# Patient Record
Sex: FEMALE | Race: WHITE | NOT HISPANIC OR LATINO | Employment: FULL TIME | URBAN - METROPOLITAN AREA
[De-identification: names, ages, dates, MRNs, and addresses within clinical notes are randomized per-mention and may not be internally consistent; named-entity substitution may affect disease eponyms.]

---

## 2019-06-10 LAB
ABO + RH BLD: NORMAL
AMPHETAMINES UR QL SCN: NEGATIVE
ARMBAND: NORMAL
BARBITURATES UR QL SCN: NEGATIVE
BASOPHILS # BLD AUTO: 0.1 10*3/UL (ref 0–0.2)
BASOPHILS NFR BLD AUTO: 1 % (ref 0–2)
BENZODIAZ UR QL SCN: NEGATIVE
BILIRUB UR QL STRIP: NEGATIVE MG/DL
BLD COMPONENT TYPE: NORMAL
BLD GP AB SCN SERPL QL: NEGATIVE
BLOOD BANK CMNT PATIENT-IMP: NORMAL
BZE UR QL SCN: NEGATIVE
CASTS URNS QL MICRO: NORMAL /[LPF]
COLOR UR: YELLOW
CONV BACTERIA IN URINE MICRO: NEGATIVE
CONV CLARITY OF URINE: CLEAR
CONV HIV-1/ HIV-2: NEGATIVE
CONV HYALINE CASTS IN URINE MICRO: 1 /[LPF] (ref 0–5)
CONV PROTEIN IN URINE BY AUTOMATED TEST STRIP: NEGATIVE MG/DL
CONV SMALL ROUND CELLS: NORMAL /[HPF]
CONV UROBILINOGEN IN URINE BY AUTOMATED TEST STRIP: 0.2 MG/DL
CREAT 24H UR-MCNC: 18.3 MG/DL
CROSSMATCH EXPIRATION: NORMAL
CULTURE INDICATED?: NORMAL
DIFFERENTIAL METHOD BLD: (no result)
EOSINOPHIL # BLD AUTO: 0.1 10*3/UL (ref 0–0.3)
EOSINOPHIL # BLD AUTO: 1 % (ref 0–3)
ERYTHROCYTE [DISTWIDTH] IN BLOOD BY AUTOMATED COUNT: 14.3 % (ref 11.5–14.5)
GLUCOSE UR QL: NEGATIVE MG/DL
HCT VFR BLD AUTO: 41.8 % (ref 35–49)
HGB BLD-MCNC: 13.9 G/DL (ref 12–15)
HGB UR QL STRIP: NEGATIVE
KETONES UR QL STRIP: NEGATIVE MG/DL
LEUKOCYTE ESTERASE UR QL STRIP: NEGATIVE
LYMPHOCYTES # BLD AUTO: 1.8 10*3/UL (ref 0.8–4.8)
LYMPHOCYTES NFR BLD AUTO: 20 % (ref 18–42)
MCH RBC QN AUTO: 32.1 PG (ref 26–32)
MCHC RBC AUTO-ENTMCNC: 33.2 G/DL (ref 32–36)
MCV RBC AUTO: 96.9 FL (ref 80–94)
METHADONE UR QL SCN: NEGATIVE
MONOCYTES # BLD AUTO: 0.6 10*3/UL (ref 0.1–1.3)
MONOCYTES NFR BLD AUTO: 7 % (ref 2–11)
NEUTROPHILS # BLD AUTO: 6.3 10*3/UL (ref 2.3–8.6)
NEUTROPHILS NFR BLD AUTO: 71 % (ref 50–75)
NITRITE UR QL STRIP: NEGATIVE
NRBC BLD AUTO-RTO: 0 /100{WBCS}
NRBC/RBC NFR BLD MANUAL: 0 10*3/UL
OPIATES TESTED UR SCN: NEGATIVE
PCP UR QL: NEGATIVE
PH UR STRIP.AUTO: 7.5 [PH] (ref 4.5–8)
PLATELET # BLD AUTO: 171 10*3/UL (ref 150–450)
PMV BLD AUTO: 9.7 FL (ref 7.4–10.4)
RBC # BLD AUTO: 4.32 10*6/UL (ref 4–5.4)
RBC #/AREA URNS HPF: 0 /[HPF] (ref 0–3)
SP GR UR: 1.01 (ref 1–1.03)
SPERM URNS QL MICRO: NORMAL /[HPF]
SQUAMOUS SPT QL MICRO: 4 /[HPF] (ref 0–5)
T PALLIDUM IGG SER QL: NONREACTIVE
THC SERPLBLD CFM-MCNC: NEGATIVE NG/ML
UNIDENT CRYS URNS QL MICRO: NORMAL /[HPF]
WBC # BLD AUTO: 8.8 10*3/UL (ref 4.5–11.5)
WBC #/AREA URNS HPF: 0 /[HPF] (ref 0–5)
YEAST SPEC QL WET PREP: NORMAL /[HPF]

## 2019-06-12 ENCOUNTER — HOSPITAL ENCOUNTER (INPATIENT)
Dept: OBSTETRICS AND GYNECOLOGY | Facility: HOSPITAL | Age: 39
LOS: 3 days | Discharge: HOME | End: 2019-06-15
Attending: OBSTETRICS & GYNECOLOGY | Admitting: OBSTETRICS & GYNECOLOGY

## 2019-06-13 LAB
BASOPHILS # BLD AUTO: 0.1 10*3/UL (ref 0–0.2)
BASOPHILS NFR BLD AUTO: 0 % (ref 0–2)
DIFFERENTIAL METHOD BLD: (no result)
EOSINOPHIL # BLD AUTO: 0.2 10*3/UL (ref 0–0.3)
EOSINOPHIL # BLD AUTO: 1 % (ref 0–3)
ERYTHROCYTE [DISTWIDTH] IN BLOOD BY AUTOMATED COUNT: 13.9 % (ref 11.5–14.5)
HCT VFR BLD AUTO: 38.7 % (ref 35–49)
HGB BLD-MCNC: 12.7 G/DL (ref 12–15)
LYMPHOCYTES # BLD AUTO: 2.7 10*3/UL (ref 0.8–4.8)
LYMPHOCYTES NFR BLD AUTO: 19 % (ref 18–42)
MCH RBC QN AUTO: 32.3 PG (ref 26–32)
MCHC RBC AUTO-ENTMCNC: 32.9 G/DL (ref 32–36)
MCV RBC AUTO: 98.2 FL (ref 80–94)
MONOCYTES # BLD AUTO: 0.9 10*3/UL (ref 0.1–1.3)
MONOCYTES NFR BLD AUTO: 6 % (ref 2–11)
NEUTROPHILS # BLD AUTO: 10.3 10*3/UL (ref 2.3–8.6)
NEUTROPHILS NFR BLD AUTO: 74 % (ref 50–75)
NRBC BLD AUTO-RTO: 0 /100{WBCS}
NRBC/RBC NFR BLD MANUAL: 0 10*3/UL
PLATELET # BLD AUTO: 145 10*3/UL (ref 150–450)
PMV BLD AUTO: 9.7 FL (ref 7.4–10.4)
RBC # BLD AUTO: 3.94 10*6/UL (ref 4–5.4)
WBC # BLD AUTO: 14.1 10*3/UL (ref 4.5–11.5)

## 2019-06-14 PROBLEM — Z98.891 STATUS POST REPEAT LOW TRANSVERSE CESAREAN SECTION: Status: ACTIVE | Noted: 2019-06-14

## 2019-06-14 LAB
BASOPHILS # BLD AUTO: 0.1 10*3/UL (ref 0–0.2)
BASOPHILS NFR BLD AUTO: 1 % (ref 0–2)
DIFFERENTIAL METHOD BLD: (no result)
EOSINOPHIL # BLD AUTO: 0.2 10*3/UL (ref 0–0.3)
EOSINOPHIL # BLD AUTO: 2 % (ref 0–3)
ERYTHROCYTE [DISTWIDTH] IN BLOOD BY AUTOMATED COUNT: 14 % (ref 11.5–14.5)
HCT VFR BLD AUTO: 35.7 % (ref 35–49)
HGB BLD-MCNC: 11.9 G/DL (ref 12–15)
LYMPHOCYTES # BLD AUTO: 1.9 10*3/UL (ref 0.8–4.8)
LYMPHOCYTES NFR BLD AUTO: 16 % (ref 18–42)
MCH RBC QN AUTO: 32.4 PG (ref 26–32)
MCHC RBC AUTO-ENTMCNC: 33.4 G/DL (ref 32–36)
MCV RBC AUTO: 97.2 FL (ref 80–94)
MONOCYTES # BLD AUTO: 0.8 10*3/UL (ref 0.1–1.3)
MONOCYTES NFR BLD AUTO: 7 % (ref 2–11)
NEUTROPHILS # BLD AUTO: 8.7 10*3/UL (ref 2.3–8.6)
NEUTROPHILS NFR BLD AUTO: 74 % (ref 50–75)
NRBC BLD AUTO-RTO: 0 /100{WBCS}
NRBC/RBC NFR BLD MANUAL: 0 10*3/UL
PLATELET # BLD AUTO: 157 10*3/UL (ref 150–450)
PMV BLD AUTO: 9.6 FL (ref 7.4–10.4)
RBC # BLD AUTO: 3.68 10*6/UL (ref 4–5.4)
WBC # BLD AUTO: 11.7 10*3/UL (ref 4.5–11.5)

## 2019-06-14 RX ORDER — SIMETHICONE 80 MG
80 TABLET,CHEWABLE ORAL 3 TIMES DAILY PRN
Status: DISCONTINUED | OUTPATIENT
Start: 2019-06-15 | End: 2019-06-15 | Stop reason: HOSPADM

## 2019-06-14 RX ORDER — DOCUSATE SODIUM 100 MG/1
100 CAPSULE, LIQUID FILLED ORAL 2 TIMES DAILY
Status: DISCONTINUED | OUTPATIENT
Start: 2019-06-15 | End: 2019-06-15 | Stop reason: HOSPADM

## 2019-06-14 RX ORDER — IBUPROFEN 600 MG/1
600 TABLET ORAL EVERY 6 HOURS PRN
Status: DISCONTINUED | OUTPATIENT
Start: 2019-06-15 | End: 2019-06-15 | Stop reason: HOSPADM

## 2019-06-14 RX ORDER — BISACODYL 10 MG
10 SUPPOSITORY, RECTAL RECTAL NIGHTLY PRN
Status: DISCONTINUED | OUTPATIENT
Start: 2019-06-15 | End: 2019-06-15 | Stop reason: HOSPADM

## 2019-06-14 RX ORDER — OXYCODONE HYDROCHLORIDE 5 MG/1
5 TABLET ORAL EVERY 4 HOURS PRN
Status: DISCONTINUED | OUTPATIENT
Start: 2019-06-15 | End: 2019-06-15 | Stop reason: HOSPADM

## 2019-06-14 RX ORDER — LANOLIN 100 %
OINTMENT (GRAM) TOPICAL AS NEEDED
Status: DISCONTINUED | OUTPATIENT
Start: 2019-06-15 | End: 2019-06-15 | Stop reason: HOSPADM

## 2019-06-14 RX ORDER — FERROUS SULFATE TAB EC 324 MG (65 MG FE EQUIVALENT) 324 (65 FE) MG
324 TABLET DELAYED RESPONSE ORAL 2 TIMES DAILY WITH MEALS
Status: DISCONTINUED | OUTPATIENT
Start: 2019-06-15 | End: 2019-06-15 | Stop reason: HOSPADM

## 2019-06-14 RX ORDER — MULTIVITAMIN WITH IRON
1 TABLET ORAL DAILY
COMMUNITY
End: 2019-06-15 | Stop reason: HOSPADM

## 2019-06-14 RX ORDER — PRENATAL VIT/IRON FUM/FOLIC AC 27MG-0.8MG
1 TABLET ORAL DAILY
Status: DISCONTINUED | OUTPATIENT
Start: 2019-06-15 | End: 2019-06-15 | Stop reason: HOSPADM

## 2019-06-15 VITALS
OXYGEN SATURATION: 100 % | TEMPERATURE: 97.7 F | WEIGHT: 136.69 LBS | HEART RATE: 85 BPM | DIASTOLIC BLOOD PRESSURE: 69 MMHG | RESPIRATION RATE: 16 BRPM | SYSTOLIC BLOOD PRESSURE: 103 MMHG | BODY MASS INDEX: 24.22 KG/M2 | HEIGHT: 63 IN

## 2019-06-15 PROBLEM — Z98.891 STATUS POST REPEAT LOW TRANSVERSE CESAREAN SECTION: Status: RESOLVED | Noted: 2019-06-14 | Resolved: 2019-06-15

## 2019-06-15 RX ORDER — IBUPROFEN 600 MG/1
600 TABLET ORAL EVERY 6 HOURS PRN
Qty: 30 TABLET | Refills: 1 | Status: SHIPPED | OUTPATIENT
Start: 2019-06-15 | End: 2021-05-12

## 2019-06-15 RX ORDER — ACETAMINOPHEN AND CODEINE PHOSPHATE 120; 12 MG/5ML; MG/5ML
1 SOLUTION ORAL DAILY
Qty: 28 TABLET | Refills: 12 | Status: SHIPPED | OUTPATIENT
Start: 2019-07-15 | End: 2020-07-14

## 2019-06-15 RX ORDER — OXYCODONE HYDROCHLORIDE 5 MG/1
5 TABLET ORAL EVERY 4 HOURS PRN
Qty: 30 TABLET | Refills: 0 | Status: SHIPPED | OUTPATIENT
Start: 2019-06-15 | End: 2019-06-25

## 2019-06-15 RX ADMIN — IBUPROFEN 600 MG: 600 TABLET ORAL at 03:35

## 2019-06-15 RX ADMIN — IBUPROFEN 600 MG: 600 TABLET ORAL at 09:37

## 2019-06-15 RX ADMIN — OXYCODONE HYDROCHLORIDE 5 MG: 5 TABLET ORAL at 03:35

## 2019-06-15 RX ADMIN — OXYCODONE HYDROCHLORIDE 5 MG: 5 TABLET ORAL at 09:38

## 2019-06-15 RX ADMIN — PRENATAL VIT W/ FE FUMARATE-FA TAB 27-0.8 MG 1 TABLET: 27-0.8 TAB at 09:37

## 2019-06-15 RX ADMIN — DOCUSATE SODIUM 100 MG: 100 CAPSULE, LIQUID FILLED ORAL at 09:38

## 2019-06-15 RX ADMIN — FERROUS SULFATE TAB EC 324 MG (65 MG FE EQUIVALENT) 324 MG: 324 (65 FE) TABLET DELAYED RESPONSE at 13:04

## 2019-07-12 ENCOUNTER — PREP FOR SURGERY (OUTPATIENT)
Dept: OTHER | Facility: HOSPITAL | Age: 39
End: 2019-07-12

## 2021-05-12 ENCOUNTER — LAB (OUTPATIENT)
Dept: LAB | Facility: HOSPITAL | Age: 41
End: 2021-05-12

## 2021-05-12 ENCOUNTER — CONSULT (OUTPATIENT)
Dept: ONCOLOGY | Facility: CLINIC | Age: 41
End: 2021-05-12

## 2021-05-12 VITALS
BODY MASS INDEX: 19.91 KG/M2 | DIASTOLIC BLOOD PRESSURE: 78 MMHG | WEIGHT: 112.4 LBS | SYSTOLIC BLOOD PRESSURE: 129 MMHG | HEIGHT: 63 IN | OXYGEN SATURATION: 98 % | RESPIRATION RATE: 18 BRPM | HEART RATE: 96 BPM | TEMPERATURE: 97.7 F

## 2021-05-12 DIAGNOSIS — D68.2 FACTOR V DEFICIENCY (HCC): ICD-10-CM

## 2021-05-12 DIAGNOSIS — D68.2 FACTOR V DEFICIENCY (HCC): Primary | ICD-10-CM

## 2021-05-12 LAB — HCYS SERPL-MCNC: 5.6 UMOL/L (ref 0–15)

## 2021-05-12 PROCEDURE — 83090 ASSAY OF HOMOCYSTEINE: CPT

## 2021-05-12 PROCEDURE — 36415 COLL VENOUS BLD VENIPUNCTURE: CPT

## 2021-05-12 PROCEDURE — 99204 OFFICE O/P NEW MOD 45 MIN: CPT | Performed by: INTERNAL MEDICINE

## 2021-05-12 RX ORDER — MULTIVIT-MIN/IRON/FOLIC ACID/K 18-600-40
CAPSULE ORAL
COMMUNITY

## 2021-05-12 NOTE — PROGRESS NOTES
HEMATOLOGY ONCOLOGY OUTPATIENT CONSULTATION       Patient name: Amalia Birmignham  : 1980  MRN: [id number]  Primary Care Physician: Kaylyn Rajput MD  Referring Physician: Kaylyn Rajput MD  Reason For Consult:     Chief Complaint   Patient presents with   • Appointment      Family history of diseases of the blood and blood-forming organs and certain disorders involving the immune mechanism   • Follow-up         HPI:   History of Present Illness:  Amalia Birmingham is 40 y.o. female who presented to our office on 21 for consultation regarding heterozygous factor V Leiden mutation.  Her mother also has factor V Leiden.  Several other family members on her maternal side including her 2 aunts also have factor V Leiden.  Patient had testing for thrombophilia 3/26/2021 through her gynecologist, Dr. Rajput due to known family history of factor V Leiden.  Thrombophilia work-up also showed MTHFR mutation.    · 2019: WBC 14.1, hemoglobin 12.7, MCV 98.2, platelets 145, 6/10/2019: HIV 1/HIV-2 negative,  · 3/26/2021 lupus anticoagulant testing negative, DRV VT screen 39 seconds, beta-2 glycoprotein antibodies negative, phosphatidylserine antibodies negative, cardiolipin antibodies negative, Antithrombin % normal, prothrombin gene mutation negative, factor V Leiden mutation positive for 1 copy, homocystine 5.3 normal, MTHFR mutation positive for 2 copies of  [id number] variant..  Protein C antigen 139%, protein S antigen 1 1 7%,      Subjective:  • 21.  Patient presents for initial consultation.  She personally never had a DVT or PE.  Her mother, 2 maternal aunts have factor V Leiden.  She denies any thrombosis in her mother or aunts.  She had 2 successful pregnancies, both of which she had  sections without any thrombosis complications.  Patient denies requiring any anticoagulation or thromboprophylaxis during these 2 pregnancies.    The following portions of the  patient's history were reviewed and updated as appropriate: allergies.    Past Medical History:   Diagnosis Date   • No known health problems        Past Surgical History:   Procedure Laterality Date   •  SECTION  2019   •  SECTION  10/20/2015   • MOLE REMOVAL  2019   • TONSILLECTOMY  1993   • WISDOM TOOTH EXTRACTION      2000         Current Outpatient Medications:   •  Vitamin D, Cholecalciferol, 50 MCG ( [place]) capsule, Take  by mouth., Disp: , Rfl:     No Known Allergies    Family History   Problem Relation Age of Onset   • Factor V Leiden deficiency Mother    • Esophageal cancer Father    • Prostate cancer Father    • Diabetes Maternal Grandmother    • Diabetes Paternal Grandfather    • Parkinsonism Paternal Grandfather        Cancer-related family history includes Esophageal cancer in her father; Prostate cancer in her father.    Social History     Tobacco Use   • Smoking status: Never Smoker   • Smokeless tobacco: Never Used   Vaping Use   • Vaping Use: Never used   Substance Use Topics   • Alcohol use: Yes   • Drug use: Never     Social History     Social History Narrative    Ms. Birmingham is a resident of [place], she has been a resident for ten years, She lives at home with her , kids and dogs. She has two children ages one and five. She works at [employer] as an . She has been employed with [employer] for 19 years. She does not smoke, drinks a few times a month and has never used any recreational drugs.         ROS:     Review of Systems   Constitutional: Negative for chills and fever.   HENT: Negative for ear pain, mouth sores, nosebleeds and sore throat.    Eyes: Negative for photophobia and visual disturbance.   Respiratory: Negative for wheezing and stridor.    Cardiovascular: Negative for chest pain and palpitations.   Gastrointestinal: Negative for abdominal pain, diarrhea, nausea and vomiting.   Endocrine: Negative for cold intolerance and heat  "intolerance.   Genitourinary: Negative for dysuria and hematuria.   Musculoskeletal: Negative for joint swelling and neck stiffness.   Skin: Negative for color change and rash.   Neurological: Negative for seizures and syncope.   Hematological: Negative for adenopathy.        No obvious bleeding   Psychiatric/Behavioral: Negative for agitation, confusion and hallucinations.         Objective:    Vitals:    05/12/21 1316   BP: 129/78   Pulse: 96   Resp: 18   Temp: 97.7 °F (36.5 °C)   SpO2: 98%   Weight: 51 kg (112 lb 6.4 oz)   Height: 160 cm (63\")   PainSc: 0-No pain     Body mass index is 19.91 kg/m².  ECOG  (0) Fully active, able to carry on all predisease performance without restriction    Physical Exam:     Physical Exam  Vitals and nursing note reviewed.   Constitutional:       General: She is not in acute distress.     Appearance: She is not diaphoretic.   HENT:      Head: Normocephalic and atraumatic.   Eyes:      General: No scleral icterus.        Right eye: No discharge.         Left eye: No discharge.      Conjunctiva/sclera: Conjunctivae normal.   Neck:      Thyroid: No thyromegaly.   Cardiovascular:      Rate and Rhythm: Normal rate and regular rhythm.      Heart sounds: Normal heart sounds. No friction rub. No gallop.    Pulmonary:      Effort: Pulmonary effort is normal. No respiratory distress.      Breath sounds: No stridor. No wheezing.   Abdominal:      General: Bowel sounds are normal.      Palpations: Abdomen is soft. There is no mass.      Tenderness: There is no abdominal tenderness. There is no guarding or rebound.   Musculoskeletal:         General: No tenderness. Normal range of motion.      Cervical back: Normal range of motion and neck supple.   Lymphadenopathy:      Cervical: No cervical adenopathy.   Skin:     General: Skin is warm.      Findings: No erythema or rash.   Neurological:      Mental Status: She is alert and oriented to person, place, and time.      Motor: No abnormal muscle " tone.   Psychiatric:         Behavior: Behavior normal.               No results for input(s): WBC, HGB, HCT, PLT, MCV in the last 30087 hours.  No results for input(s): NA, K, CL, CO2, BUN, CREATININE, CALCIUM, BILITOT, ALKPHOS, ALT, AST, GLUCOSE in the last 07603 hours.    Invalid input(s): LABALBU, PROT    No results found for: GLUCOSE, BUN, CREATININE, EGFRIFNONA, EGFRIFAFRI, BCR, K, CO2, CALCIUM, PROTENTOTREF, ALBUMIN, LABIL2, BILIRUBIN, AST, ALT    No results for input(s): APTT, INR, PTT in the last 51108 hours.    No results found for: IRON, TIBC, FERRITIN    No results found for: FOLATE    No results found for: OCCULTBLD    No results found for: RETICCTPCT    No results found for: [id number]  No results found for: SPEP, UPEP  No results found for: LDH, URICACID  No results found for: TALON, RF, SEDRATE  No results found for: FIBRINOGEN, HAPTOGLOBIN  No results found for: PTT, INR  No results found for:   No results found for: CEA  No components found for: CA-19-9  No results found for: PSA  No results found for: AFPTM, DK8273, HCGTM, SPEP, RODRICK         Assessment/Plan     Assessment:  1. Factor V Leiden heterozygous mutation: Patient is asymptomatic.  She never had any DVT or PE.  She had 2 successful pregnancies with  sections without any thrombotic complications.  She understands she is at a higher risk of thrombosis than the general population due to the presence of rotation.  2. MTHFR homozygous 1298C mutation.:  Discussed with the patient that this mutation is clinically not significant, and is less likely to correlate with thrombosis risk.  It may sometimes cause high homocysteine levels.          Plan:  1. She never had thrombosis.  Therefore she does not require any anticoagulation.  2. Educated and counseled patient regarding the signs and symptoms of thrombosis.  She was instructed to seek medical attention if she develops any signs or symptoms of PE or DVT.  3. If she plans another  pregnancy, she would not require thromboprophylaxis, since she has  4. If she goes through any major surgery, or if she goes for any long road trip or flight journey, advised to consider thromboprophylaxis or at least aspirin 325..  5. She will follow back with us if she develops any thrombosis.  6. We will check her serum homocysteine level   7. Follow-up in about 6 weeks                          Thank you very much for providing the opportunity to participate in this patient’s care. Please do not hesitate to call if there are any other questions    I have reviewed and confirmed the accuracy of the patient's history: Chief complaint, HPI, ROS, Subjective and Past Family Social History as entered by the MA/LPN/RN. Anthony Johnson MD 05/12/21       Electronically signed by Anthony Johnson MD, 05/12/21, 1:41 PM EDT.

## 2021-06-21 NOTE — PROGRESS NOTES
HEMATOLOGY ONCOLOGY OUTPATIENT FOLLOW UP      Patient name: Amalia Birmingham  : 1980  MRN: [id number]  Primary Care Physician: Kaylyn Rajput MD  Referring Physician: Kaylyn Rajput MD  Reason For Consult:     Chief Complaint   Patient presents with   • Appointment     Factor V Leiden heterozygous mutation   • Follow-up         HPI:   History of Present Illness:  Amalia Birmingham is 40 y.o. female who presented to our office on 21 for consultation regarding heterozygous factor V Leiden mutation.  Her mother also has factor V Leiden.  Several other family members on her maternal side including her 2 aunts also have factor V Leiden.  Patient had testing for thrombophilia 3/26/2021 through her gynecologist, Dr. Rajput due to known family history of factor V Leiden.  Thrombophilia work-up also showed MTHFR mutation.    · 2019: WBC 14.1, hemoglobin 12.7, MCV 98.2, platelets 145, 6/10/2019: HIV 1/HIV-2 negative,  · 3/26/2021 lupus anticoagulant testing negative, DRV VT screen 39 seconds, beta-2 glycoprotein antibodies negative, phosphatidylserine antibodies negative, cardiolipin antibodies negative, Antithrombin % normal, prothrombin gene mutation negative, factor V Leiden mutation positive for 1 copy, homocystine 5.3 normal, MTHFR mutation positive for 2 copies of  [id number] variant..  Protein C antigen 139%, protein S antigen 1 1 7%,    • 21.  Patient presents for initial consultation.  She personally never had a DVT or PE.  Her mother, 2 maternal aunts have factor V Leiden.  She denies any thrombosis in her mother or aunts.  She had 2 successful pregnancies, both of which she had  sections without any thrombosis complications.  Patient denies requiring any anticoagulation or thromboprophylaxis during these 2 pregnancies.  • 2021: Plasma homocystine 5.6,   • 2021: WBC 8.7, hemoglobin 14.7, platelets 228, MCV  93.1      Subjective:    Occasionally has a cramp in the left calf.  But it goes away.  No other complaints.  Doing well.  Recently was on a trip to [place].  She said she took a lot of breaks.        The following portions of the patient's history were reviewed and updated as appropriate: allergies.    Past Medical History:   Diagnosis Date   • No known health problems        Past Surgical History:   Procedure Laterality Date   •  SECTION  2019   •  SECTION  10/20/2015   • MOLE REMOVAL  2019   • TONSILLECTOMY  1993   • WISDOM TOOTH EXTRACTION      2000         Current Outpatient Medications:   •  Vitamin D, Cholecalciferol, 50 MCG (2000) capsule, Take  by mouth., Disp: , Rfl:   •  aspirin 81 MG chewable tablet, Chew 81 mg Daily., Disp: , Rfl:     No Known Allergies    Family History   Problem Relation Age of Onset   • Factor V Leiden deficiency Mother    • Esophageal cancer Father    • Prostate cancer Father    • Diabetes Maternal Grandmother    • Diabetes Paternal Grandfather    • Parkinsonism Paternal Grandfather        Cancer-related family history includes Esophageal cancer in her father; Prostate cancer in her father.    Social History     Tobacco Use   • Smoking status: Never Smoker   • Smokeless tobacco: Never Used   Vaping Use   • Vaping Use: Never used   Substance Use Topics   • Alcohol use: Yes   • Drug use: Never     Social History     Social History Narrative    Ms. Birmingham is a resident of [place], she has been a resident for ten years, She lives at home with her , kids and dogs. She has two children ages one and five. She works at [employer] as an . She has been employed with [employer] for 19 years. She does not smoke, drinks a few times a month and has never used any recreational drugs.         ROS:     Review of Systems   Constitutional: Negative for chills and fever.   HENT: Negative for ear pain, mouth sores, nosebleeds and sore throat.    Eyes:  "Negative for photophobia and visual disturbance.   Respiratory: Negative for wheezing and stridor.    Cardiovascular: Negative for chest pain and palpitations.   Gastrointestinal: Negative for abdominal pain, diarrhea, nausea and vomiting.   Endocrine: Negative for cold intolerance and heat intolerance.   Genitourinary: Negative for dysuria and hematuria.   Musculoskeletal: Negative for joint swelling and neck stiffness.   Skin: Negative for color change and rash.   Neurological: Negative for seizures and syncope.   Hematological: Negative for adenopathy.        No obvious bleeding   Psychiatric/Behavioral: Negative for agitation, confusion and hallucinations.         Objective:    Vitals:    06/23/21 1403   BP: 125/82   Pulse: 96   Resp: 20   Temp: 98.1 °F (36.7 °C)   Weight: 50.3 kg (111 lb)   Height: 160 cm (63\")   PainSc: 0-No pain     Body mass index is 19.66 kg/m².  ECOG  (0) Fully active, able to carry on all predisease performance without restriction    Physical Exam:     Physical Exam  Vitals and nursing note reviewed.   Constitutional:       General: She is not in acute distress.     Appearance: Normal appearance. She is not diaphoretic.   HENT:      Head: Normocephalic and atraumatic.   Eyes:      General: No scleral icterus.        Right eye: No discharge.         Left eye: No discharge.      Conjunctiva/sclera: Conjunctivae normal.   Neck:      Thyroid: No thyromegaly.   Cardiovascular:      Rate and Rhythm: Normal rate and regular rhythm.      Heart sounds: Normal heart sounds. No friction rub. No gallop.    Pulmonary:      Effort: Pulmonary effort is normal. No respiratory distress.      Breath sounds: No stridor. No wheezing.   Abdominal:      General: Bowel sounds are normal.      Palpations: Abdomen is soft. There is no mass.      Tenderness: There is no abdominal tenderness. There is no guarding or rebound.   Musculoskeletal:         General: No tenderness. Normal range of motion.      Cervical " back: Normal range of motion and neck supple.   Lymphadenopathy:      Cervical: No cervical adenopathy.   Skin:     General: Skin is warm.      Findings: No erythema or rash.   Neurological:      General: No focal deficit present.      Mental Status: She is alert and oriented to person, place, and time.      Motor: No abnormal muscle tone.   Psychiatric:         Mood and Affect: Mood normal.         Behavior: Behavior normal.       I have reexamined the patient and the results are consistent with the previously documented exam. Anthony Johnson MD             Lab Results - Last 18 Months   Lab Units 21  1357   WBC 10*3/mm3 8.72   HEMOGLOBIN g/dL 14.7   HEMATOCRIT % 44.5   PLATELETS 10*3/mm3 228   MCV fL 93.1     No results for input(s): NA, K, CL, CO2, BUN, CREATININE, CALCIUM, BILITOT, ALKPHOS, ALT, AST, GLUCOSE in the last 67168 hours.    Invalid input(s): LABALBU, PROT    No results found for: GLUCOSE, BUN, CREATININE, EGFRIFNONA, EGFRIFAFRI, BCR, K, CO2, CALCIUM, PROTENTOTREF, ALBUMIN, LABIL2, BILIRUBIN, AST, ALT    No results for input(s): APTT, INR, PTT in the last 20123 hours.    No results found for: IRON, TIBC, FERRITIN    No results found for: FOLATE    No results found for: OCCULTBLD    No results found for: RETICCTPCT    No results found for: [id number]  No results found for: SPEP, UPEP  No results found for: LDH, URICACID  No results found for: TALON, RF, SEDRATE  No results found for: FIBRINOGEN, HAPTOGLOBIN  No results found for: PTT, INR  No results found for:   No results found for: CEA  No components found for: CA-19-9  No results found for: PSA  No results found for: AFPTM, OZ1329, HCGTM, SPEP, RODRICK         Assessment/Plan     Assessment:  1. Factor V Leiden heterozygous mutation: Patient is asymptomatic.  She never had any DVT or PE.  She had 2 successful pregnancies with  sections without any thrombotic complications.  She understands she is at a higher risk of thrombosis than the  general population due to the presence of this mutation  2. MTHFR homozygous 1298C mutation.:  Plasma homocysteine was checked and it was normal.  Discussed with the patient that this mutation is clinically not significant, and is unlikely to correlate with thrombosis risk. .          Plan:  1. She never had thrombosis.  Therefore she does not require any anticoagulation.  2. Educated and counseled patient regarding the signs and symptoms of thrombosis.  She was instructed to seek medical attention if she develops any signs or symptoms of PE or DVT.  3. If she plans another pregnancy, she would not require thromboprophylaxis  4. If she goes through any major surgery, or if she goes for any long road trip or flight journey, advised to consider thromboprophylaxis or at least aspirin 325..  5. Educated the patient about the utility of D-dimer in screening for thrombosis when her symptoms are concerning.  6. She will follow back with us if she develops any thrombosis.  7. Follow-up as needed.                          Thank you very much for providing the opportunity to participate in this patient’s care. Please do not hesitate to call if there are any other questions    I have reviewed and confirmed the accuracy of the patient's history: Chief complaint, HPI, ROS, Subjective and Past Family Social History as entered by the MA/LPN/RN. Anthony Johnson MD 06/23/21       Electronically signed by Anthony Johnson MD, 06/23/21, 3:07 PM EDT.

## 2021-06-23 ENCOUNTER — LAB (OUTPATIENT)
Dept: LAB | Facility: HOSPITAL | Age: 41
End: 2021-06-23

## 2021-06-23 ENCOUNTER — OFFICE VISIT (OUTPATIENT)
Dept: ONCOLOGY | Facility: CLINIC | Age: 41
End: 2021-06-23

## 2021-06-23 VITALS
BODY MASS INDEX: 19.67 KG/M2 | SYSTOLIC BLOOD PRESSURE: 125 MMHG | RESPIRATION RATE: 20 BRPM | TEMPERATURE: 98.1 F | HEIGHT: 63 IN | WEIGHT: 111 LBS | DIASTOLIC BLOOD PRESSURE: 82 MMHG | HEART RATE: 96 BPM

## 2021-06-23 DIAGNOSIS — D68.2 FACTOR V DEFICIENCY (HCC): Primary | ICD-10-CM

## 2021-06-23 DIAGNOSIS — D68.2 FACTOR V DEFICIENCY (HCC): ICD-10-CM

## 2021-06-23 LAB
BASOPHILS # BLD AUTO: 0.03 10*3/MM3 (ref 0–0.2)
BASOPHILS NFR BLD AUTO: 0.3 % (ref 0–1.5)
DEPRECATED RDW RBC AUTO: 40.5 FL (ref 37–54)
EOSINOPHIL # BLD AUTO: 0.08 10*3/MM3 (ref 0–0.4)
EOSINOPHIL NFR BLD AUTO: 0.9 % (ref 0.3–6.2)
ERYTHROCYTE [DISTWIDTH] IN BLOOD BY AUTOMATED COUNT: 12 % (ref 12.3–15.4)
HCT VFR BLD AUTO: 44.5 % (ref 34–46.6)
HGB BLD-MCNC: 14.7 G/DL (ref 12–15.9)
LYMPHOCYTES # BLD AUTO: 2.69 10*3/MM3 (ref 0.7–3.1)
LYMPHOCYTES NFR BLD AUTO: 30.8 % (ref 19.6–45.3)
MCH RBC QN AUTO: 30.8 PG (ref 26.6–33)
MCHC RBC AUTO-ENTMCNC: 33 G/DL (ref 31.5–35.7)
MCV RBC AUTO: 93.1 FL (ref 79–97)
MONOCYTES # BLD AUTO: 0.49 10*3/MM3 (ref 0.1–0.9)
MONOCYTES NFR BLD AUTO: 5.6 % (ref 5–12)
NEUTROPHILS NFR BLD AUTO: 5.43 10*3/MM3 (ref 1.7–7)
NEUTROPHILS NFR BLD AUTO: 62.4 % (ref 42.7–76)
PLATELET # BLD AUTO: 228 10*3/MM3 (ref 140–450)
PMV BLD AUTO: 10.8 FL (ref 6–12)
RBC # BLD AUTO: 4.78 10*6/MM3 (ref 3.77–5.28)
WBC # BLD AUTO: 8.72 10*3/MM3 (ref 3.4–10.8)

## 2021-06-23 PROCEDURE — 36415 COLL VENOUS BLD VENIPUNCTURE: CPT

## 2021-06-23 PROCEDURE — 85025 COMPLETE CBC W/AUTO DIFF WBC: CPT

## 2021-06-23 PROCEDURE — 99214 OFFICE O/P EST MOD 30 MIN: CPT | Performed by: INTERNAL MEDICINE

## 2021-06-23 RX ORDER — ASPIRIN 81 MG/1
81 TABLET, CHEWABLE ORAL DAILY
COMMUNITY